# Patient Record
Sex: FEMALE | Race: WHITE | NOT HISPANIC OR LATINO | Employment: FULL TIME | URBAN - NONMETROPOLITAN AREA
[De-identification: names, ages, dates, MRNs, and addresses within clinical notes are randomized per-mention and may not be internally consistent; named-entity substitution may affect disease eponyms.]

---

## 2017-04-18 ENCOUNTER — OFFICE VISIT (OUTPATIENT)
Dept: FAMILY MEDICINE CLINIC | Facility: CLINIC | Age: 22
End: 2017-04-18

## 2017-04-18 VITALS
DIASTOLIC BLOOD PRESSURE: 64 MMHG | OXYGEN SATURATION: 98 % | SYSTOLIC BLOOD PRESSURE: 100 MMHG | RESPIRATION RATE: 16 BRPM | HEART RATE: 75 BPM | WEIGHT: 143.2 LBS | BODY MASS INDEX: 25.37 KG/M2 | TEMPERATURE: 98.4 F | HEIGHT: 63 IN

## 2017-04-18 DIAGNOSIS — J02.9 SORE THROAT: Primary | ICD-10-CM

## 2017-04-18 LAB
EXPIRATION DATE: NORMAL
INTERNAL CONTROL: NORMAL
Lab: NORMAL
S PYO AG THROAT QL: NEGATIVE

## 2017-04-18 PROCEDURE — 99213 OFFICE O/P EST LOW 20 MIN: CPT | Performed by: FAMILY MEDICINE

## 2017-04-18 PROCEDURE — 87880 STREP A ASSAY W/OPTIC: CPT | Performed by: FAMILY MEDICINE

## 2017-04-18 NOTE — PROGRESS NOTES
Chief Complaint   Patient presents with   • Sore Throat     Patient said her sore throat last night. She was around a child that had strep.        History:  Ayala Lyons is a 22 y.o. female presents who today for evaluation of the above problems.  PCP currently listed as No Known Provider.     Present with  Rey, permission to speak freely given.  Last night sore throat worsening.  She has had some chills.  Was in [place] this past weekend.    and he had strep exposure.  Temp normally 97 and up to 98 today.  LMP 2 weeks ago  and normal for her.  No tobacco.  Vitals are normal, weight is normal. No seasonal allergies.  Works as nurse at .  No diurnal pattern, worsening through the day today.  The patient has no rash. Acid reflux, history of H. Pylori.  She does not want w/u today.  I will provide lab cup if she decides to provide stool sample. Using prilosec.  Zantac 30 minute onset, less likely to cause some issues as prilosec/PPI.  She will f/u if needed.     Ayala Lyons  has no past medical history on file.    No Known Allergies  History reviewed. No pertinent past medical history.  Past Surgical History:   Procedure Laterality Date   • HYMENECTOMY     • MOUTH SURGERY     • WISDOM TOOTH EXTRACTION       Family History   Problem Relation Age of Onset   • Diabetes Mother    • Hypertension Mother    • Hypertension Father    • Thyroid disease Sister    • Cancer Maternal Grandmother    • Lung cancer Maternal Grandmother    • Leukemia Maternal Grandfather        Current Outpatient Prescriptions on File Prior to Visit   Medication Sig Dispense Refill   • omeprazole (priLOSEC) 40 MG capsule Take 1 capsule by mouth Daily. 30 capsule 3     No current facility-administered medications on file prior to visit.        Family history, surgical history, past medical history, Allergies and meds reviewed with patient today and updated in [facility].     ROS:  Review of Systems  "  Constitutional: Negative for activity change and appetite change.   HENT: Positive for sore throat.    Eyes: Negative for discharge and itching.   Respiratory: Negative for apnea and chest tightness.    Cardiovascular: Negative for chest pain and leg swelling.   Gastrointestinal: Negative for abdominal distention.   Endocrine: Negative for cold intolerance and heat intolerance.   Genitourinary: Negative for difficulty urinating and dyspareunia.   Musculoskeletal: Negative for arthralgias and back pain.   Neurological: Negative for dizziness and facial asymmetry.   Hematological: Negative for adenopathy. Does not bruise/bleed easily.   Psychiatric/Behavioral: Negative for agitation and behavioral problems.       OBJECTIVE:  Vitals:    04/18/17 1506   BP: 100/64   BP Location: Left arm   Patient Position: Sitting   Cuff Size: Adult   Pulse: 75   Resp: 16   Temp: 98.4 °F (36.9 °C)   TempSrc: Oral   SpO2: 98%   Weight: 143 lb 3.2 oz (65 kg)   Height: 63\" (160 cm)     Physical Exam   Constitutional: She is oriented to person, place, and time. She appears well-developed and well-nourished. No distress.   HENT:   Head: Normocephalic and atraumatic.   Right Ear: Tympanic membrane and external ear normal.   Left Ear: Tympanic membrane and external ear normal.   Nose: Mucosal edema and rhinorrhea present. No epistaxis.  No foreign bodies.   Mouth/Throat: Normal dentition. No uvula swelling. Posterior oropharyngeal erythema present. No oropharyngeal exudate, posterior oropharyngeal edema or tonsillar abscesses. No tonsillar exudate.   Boggy turbinates, congestion present.    Eyes: Conjunctivae and EOM are normal. Pupils are equal, round, and reactive to light.   Neck: Normal range of motion. No thyromegaly present.   Cardiovascular: Normal rate, regular rhythm, normal heart sounds and intact distal pulses.    Pulmonary/Chest: No respiratory distress. She has no wheezes. She exhibits no tenderness.   CHEST/LUNG: Inspection- " symmetric chest wall no pectus deformity. Decreased effort, no distress, no use of accessory muscles. Palpation- nontender sternum, ribline.  No abnormal pulsations. Auscultation- Breath sounds mildly reduced and intermittent coarse sounds throughout all lung fields, decreased effort.  Normal tracheal sounds, Normal bronchial sounds overlying sternum, Bronchovessicular sounds decreased and coarse between scapulae posteriorly and with vessicular breath sounds heard throughout periphery. Adventitious sounds- No wheezes, no rales, intermittent rhonchi.    Abdominal: Soft. Bowel sounds are normal. She exhibits no mass. There is no tenderness. There is no rebound and no guarding.   Musculoskeletal: Normal range of motion. She exhibits no tenderness.   Lymphadenopathy:     She has no cervical adenopathy.   Neurological: She is alert and oriented to person, place, and time. No cranial nerve deficit.   Skin: Skin is warm. No rash noted.   Psychiatric: She has a normal mood and affect. Her behavior is normal. Judgment and thought content normal.   Nursing note and vitals reviewed.      Assessment/Plan:  Pharyngitis: Ddx discussed today viral URI with post nasal drip, strep pharyngitis, mononucleosis, viral pharyngitis such as herpangina H/F/M syndrome.  Patient has acute pharyngitis by history and exam.  Discussed centor criteria. Discussed limitations of rapid strep test.  Discussed mono and rash and role of PCN in this process.  If rapid strep + we will treat, if negative we will culture.  Current recommendation is to not treat without + test.  Reviewed exposures and travel.  We discussed antibiotic options, to include PCN (amoxil as first line) and macrolides as first line therapy. Discussed risks/benefits allergies to medications today.  Treatments listed.  Handouts offered and printed for the patient/family on requested medications.  Education handout provided on any new Rx.  If strep treatment is used, the patient  needs to dispose of toothbrush and use new device in 24 hours.  NO school or work until fever free 24 hours or after 24 hours of abx if no fever. Gargle salt water BID.  Fluid hydration encouraged.   · Offered handout to patient regarding dx.  · Allergy recommendations discussed.  Would change pillowcases and wash with hot/dry hot 2x weekly and change sheets minimum weekly. Consider anti-allergenic coverings for sheets/pillowcases.  Avoid tobacco, Keep pets out of room of sleeping as able.  Use home circulation instead of windows down.  Nasal steroids discussed today.  · Risks/benefits of abx and steroids discussed with patient today.  · Take abx with food to decrease risks of diarrhea. Increased yogurt to decrease this risk further.  Consider probiotics.  · Decadron, dexamethasone, methylprednisolone, prednisone. Pt notified of potential pros/risks of steroid treatment including rapid improvement of condition; allergic reaction, psychologic reaction (depression, anxiety & insomnia), skin change at injection site (color, dimpling), muscle weakness, changes in blood sugar, cataracts/glaucoma, AVN.  This list is not all inclusive and patient is aware they may refuse treatment.  · For Female patients  · Risks of yeast infection and abx d/w patient.   · If taking antibiotics and using birth control at the same time it is important to    use a backup method of birth control for 2-4 weeks as antibiotics can decrease    efficacy of the birth control.   · Please see orders as listed below.         Orders Placed Today:  Ayala was seen today for sore throat.    Diagnoses and all orders for this visit:    Sore throat  -     POCT rapid strep A  -     Strep throat culture    Other orders  -     lidocaine viscous (XYLOCAINE) 2 % solution; Take 5 mL by mouth As Needed for Mild Pain (1-3) (throat pain).        Risks/benefits of current and new medications discussed with the patient and or family today.  The patient/family are aware  and accept that if there any side effects they should call or return to clinic as soon as possible.  Appropriate F/U discussed for topics addressed today. All questions were answered to the satisfactory state of patient/family.  Should symptoms fail to improve or worsen they agree to call or return to clinic or to go to the ER. Education handouts were offered on any new Rx if requested.  Discussed the importance of following up with any needed screening tests/labs/specialist appointments and any requested follow-up recommended by me today.  Importance of maintaining follow-up discussed and patient accepts that missed appointments can delay diagnosis and potentially lead to worsening of conditions.    An After Visit Summary was printed and given to the patient at discharge.  Follow-up: Return if symptoms worsen or fail to improve.         Tony Velazquez M.D. 4/18/2017

## 2017-04-18 NOTE — PATIENT INSTRUCTIONS
Pharyngitis  Pharyngitis is redness, pain, and swelling (inflammation) of your pharynx.   CAUSES   Pharyngitis is usually caused by infection. Most of the time, these infections are from viruses (viral) and are part of a cold. However, sometimes pharyngitis is caused by bacteria (bacterial). Pharyngitis can also be caused by allergies. Viral pharyngitis may be spread from person to person by coughing, sneezing, and personal items or utensils (cups, forks, spoons, toothbrushes). Bacterial pharyngitis may be spread from person to person by more intimate contact, such as kissing.   SIGNS AND SYMPTOMS   Symptoms of pharyngitis include:    · Sore throat.    · Tiredness (fatigue).    · Low-grade fever.    · Headache.  · Joint pain and muscle aches.  · Skin rashes.  · Swollen lymph nodes.  · Plaque-like film on throat or tonsils (often seen with bacterial pharyngitis).  DIAGNOSIS   Your health care provider will ask you questions about your illness and your symptoms. Your medical history, along with a physical exam, is often all that is needed to diagnose pharyngitis. Sometimes, a rapid strep test is done. Other lab tests may also be done, depending on the suspected cause.   TREATMENT   Viral pharyngitis will usually get better in 3-4 days without the use of medicine. Bacterial pharyngitis is treated with medicines that kill germs (antibiotics).   HOME CARE INSTRUCTIONS   · Drink enough water and fluids to keep your urine clear or pale yellow.    · Only take over-the-counter or prescription medicines as directed by your health care provider:      If you are prescribed antibiotics, make sure you finish them even if you start to feel better.      Do not take aspirin.    · Get lots of rest.    · Gargle with 8 oz of salt water (½ tsp of salt per 1 qt of water) as often as every 1-2 hours to soothe your throat.    · Throat lozenges (if you are not at risk for choking) or sprays may be used to soothe your throat.  SEEK MEDICAL  CARE IF:   · You have large, tender lumps in your neck.  · You have a rash.  · You cough up green, yellow-brown, or bloody spit.  SEEK IMMEDIATE MEDICAL CARE IF:   · Your neck becomes stiff.  · You drool or are unable to swallow liquids.  · You vomit or are unable to keep medicines or liquids down.  · You have severe pain that does not go away with the use of recommended medicines.  · You have trouble breathing (not caused by a stuffy nose).  MAKE SURE YOU:   · Understand these instructions.  · Will watch your condition.  · Will get help right away if you are not doing well or get worse.     This information is not intended to replace advice given to you by your health care provider. Make sure you discuss any questions you have with your health care provider.     Document Released: 12/18/2006 Document Revised: 10/08/2014 Document Reviewed: 08/25/2014  [employer] Interactive Patient Education ©2016 [employer]  Helicobacter Pylori Antibodies Test  WHY AM I HAVING THIS TEST?  This is a blood test that looks for bacteria called Helicobacter pylori (H. pylori). H. pylori is a germ that can be found in the cells that line the stomach. Having high levels of H. pylori in your stomach puts you at risk for stomach ulcers and small bowel ulcers, long-term (chronic) inflammation of the lining of the stomach, or even ulcers that may occur in the canal that runs from the mouth to the stomach (esophagus). Presence of H. pylori can also increase your risk for stomach cancer if it is left untreated.  Most people with H. pylori in their stomach bacteria have no symptoms. Your health care provider may ask you to have this test if you have symptoms of a stomach ulcer or small bowel ulcer, such as stomach pain before or after eating, heartburn, or nausea repeatedly after eating.  WHAT KIND OF SAMPLE IS TAKEN?  A blood sample is required for this test. It is usually collected by inserting a needle into a vein or sticking a finger with a  small needle.  HOW DO I PREPARE FOR THE TEST?  There is no preparation required for this test.  WHAT ARE THE REFERENCE RANGES?  Reference ranges are considered healthy ranges established after testing a large group of healthy people. Reference ranges may vary among different people, labs, and hospitals. It is your responsibility to obtain your test results. Ask the lab or department performing the test when and how you will get your results.  Your test results will be reported as positive, negative, or equivocal. Equivocal means that your results are neither positive nor negative.  References ranges for these results are as follows:  · Less than or equal to 30 units/mL. This is negative.  · Greater than or equal to 40 units/mL. This is positive.  · 30.01-39.99 units/mL. This is equivocal.  WHAT DO THE RESULTS MEAN?  Test results that are higher than normal may indicate numerous health conditions. These may include:  · Short-term or long-term irritation of the stomach lining (gastritis).  · Small bowel ulcer.  · Stomach ulcer.  · Stomach cancer.  Talk with your health care provider to discuss your results, treatment options, and if necessary, the need for more tests. Talk with your health care provider if you have any questions about your results.     This information is not intended to replace advice given to you by your health care provider. Make sure you discuss any questions you have with your health care provider.     Document Released: 01/11/2006 Document Revised: 01/08/2016 Document Reviewed: 05/01/2015  [employer] Interactive Patient Education ©2016 [employer]

## 2017-04-21 LAB — B-HEM STREP SPEC QL CULT: NEGATIVE

## 2017-04-21 NOTE — PROGRESS NOTES
Negative strep culture.  There is no evidence of strep throat.  F/U as needed.  Tony Velazquez MD 4/21/2017 1:23 PM

## 2017-09-25 ENCOUNTER — OFFICE VISIT (OUTPATIENT)
Dept: FAMILY MEDICINE CLINIC | Facility: CLINIC | Age: 22
End: 2017-09-25

## 2017-09-25 VITALS
BODY MASS INDEX: 24.7 KG/M2 | HEIGHT: 63 IN | OXYGEN SATURATION: 97 % | SYSTOLIC BLOOD PRESSURE: 106 MMHG | WEIGHT: 139.4 LBS | DIASTOLIC BLOOD PRESSURE: 74 MMHG | HEART RATE: 90 BPM | TEMPERATURE: 98.5 F | RESPIRATION RATE: 16 BRPM

## 2017-09-25 DIAGNOSIS — R05.9 COUGH: ICD-10-CM

## 2017-09-25 DIAGNOSIS — J06.9 UPPER RESPIRATORY TRACT INFECTION, UNSPECIFIED TYPE: ICD-10-CM

## 2017-09-25 DIAGNOSIS — J02.9 SORE THROAT: Primary | ICD-10-CM

## 2017-09-25 LAB
EXPIRATION DATE: NORMAL
INTERNAL CONTROL: NORMAL
Lab: NORMAL
S PYO AG THROAT QL: NEGATIVE

## 2017-09-25 PROCEDURE — 96372 THER/PROPH/DIAG INJ SC/IM: CPT | Performed by: NURSE PRACTITIONER

## 2017-09-25 PROCEDURE — 87880 STREP A ASSAY W/OPTIC: CPT | Performed by: NURSE PRACTITIONER

## 2017-09-25 PROCEDURE — 99212 OFFICE O/P EST SF 10 MIN: CPT | Performed by: NURSE PRACTITIONER

## 2017-09-25 RX ORDER — DEXAMETHASONE SODIUM PHOSPHATE 10 MG/ML
10 INJECTION INTRAMUSCULAR; INTRAVENOUS ONCE
Status: COMPLETED | OUTPATIENT
Start: 2017-09-25 | End: 2017-09-25

## 2017-09-25 RX ORDER — FLUTICASONE PROPIONATE 50 MCG
2 SPRAY, SUSPENSION (ML) NASAL DAILY
Qty: 1 BOTTLE | Refills: 0 | Status: SHIPPED | OUTPATIENT
Start: 2017-09-25

## 2017-09-25 RX ORDER — BENZONATATE 100 MG/1
200 CAPSULE ORAL 3 TIMES DAILY PRN
Qty: 60 CAPSULE | Refills: 0 | Status: SHIPPED | OUTPATIENT
Start: 2017-09-25

## 2017-09-25 RX ORDER — DEXTROMETHORPHAN HYDROBROMIDE AND PROMETHAZINE HYDROCHLORIDE 15; 6.25 MG/5ML; MG/5ML
5 SYRUP ORAL NIGHTLY
Qty: 50 ML | Refills: 0 | Status: SHIPPED | OUTPATIENT
Start: 2017-09-25 | End: 2017-10-05

## 2017-09-25 RX ADMIN — DEXAMETHASONE SODIUM PHOSPHATE 10 MG: 10 INJECTION INTRAMUSCULAR; INTRAVENOUS at 14:07

## 2017-09-25 NOTE — PROGRESS NOTES
Chief Complaint   Patient presents with   • URI        HISTORY/ HPI:  Ayala Lyons is a 22 y.o.  female who presents  today for an acute complaint of sore throat, cough, head/ facial pain, and rhinorrhea; onset last Thursday x 5 days; describes sore throat as scratchy but is better since onset; denies radiation of pain or dental pain; has used Dayquil, Nyquil, and cough drops only with minimal relief; cough is worse at night and with lying down; rates severity of symptoms 5/10; known exposure to  with similar symptoms; no reported recent illnesses.      Ayala Lyons  has no past medical history on file.    No Known Allergies    Current Outpatient Prescriptions:   •  Norgestimate-Eth Estradiol (MONONESSA PO), Take  by mouth Daily., Disp: , Rfl:   History reviewed. No pertinent past medical history.  Past Surgical History:   Procedure Laterality Date   • HYMENECTOMY     • MOUTH SURGERY     • WISDOM TOOTH EXTRACTION       Social History     Social History   • Marital status:      Spouse name: N/A   • Number of children: N/A   • Years of education: N/A     Social History Main Topics   • Smoking status: Never Smoker   • Smokeless tobacco: Never Used   • Alcohol use No   • Drug use: No   • Sexual activity: Defer     Other Topics Concern   • None     Social History Narrative     Family History   Problem Relation Age of Onset   • Diabetes Mother    • Hypertension Mother    • Hypertension Father    • Thyroid disease Sister    • Cancer Maternal Grandmother    • Lung cancer Maternal Grandmother    • Leukemia Maternal Grandfather        Family history, surgical history, past medical history, Allergies and meds reviewed with patient today and updated in [facility].     ROS:  Review of Systems   Constitutional: Positive for chills, fatigue and fever (Thursday 99.8; no additional complaints of fever). Negative for unexpected weight change.   HENT: Positive for rhinorrhea (green in the am), sinus  "pain, sinus pressure, sneezing and sore throat (scratchy). Negative for ear pain.    Eyes: Negative for itching.   Respiratory: Positive for cough (intermittent yellow-green sputum). Negative for chest tightness.    Cardiovascular: Negative for chest pain.   Gastrointestinal: Negative.    Endocrine: Negative.    Genitourinary: Negative.    Musculoskeletal: Positive for myalgias.   Skin: Negative for rash.   Allergic/Immunologic: Negative.    Neurological: Negative.    Hematological: Negative.    Psychiatric/Behavioral: Negative.      OBJECTIVE:  Vitals:    09/25/17 1307   BP: 106/74   Pulse: 90   Resp: 16   Temp: 98.5 °F (36.9 °C)   SpO2: 97%   Weight: 139 lb 6.4 oz (63.2 kg)   Height: 63\" (160 cm)     Physical Exam   Constitutional: She is oriented to person, place, and time. Vital signs are normal. She appears well-developed and well-nourished. She is cooperative.   HENT:   Head: Normocephalic and atraumatic.   Right Ear: Tympanic membrane normal. Tympanic membrane is not injected, not perforated, not erythematous, not retracted and not bulging.   Left Ear: Tympanic membrane normal. Tympanic membrane is not injected, not perforated, not erythematous, not retracted and not bulging.   Nose: Mucosal edema and rhinorrhea present. Sinus tenderness: clear.   Mouth/Throat: Oropharynx is clear and moist and mucous membranes are normal. No posterior oropharyngeal erythema. Tonsils are 2+ on the right. Tonsils are 2+ on the left. No tonsillar exudate.   Eyes: Conjunctivae and EOM are normal. Pupils are equal, round, and reactive to light.   Neck: Trachea normal and normal range of motion. Neck supple. No thyromegaly present.   Cardiovascular: Normal rate, regular rhythm and normal heart sounds.    Pulmonary/Chest: Effort normal. No respiratory distress.   Abdominal: Soft.   Lymphadenopathy:        Head (right side): Tonsillar adenopathy present.        Head (left side): Tonsillar adenopathy present.   Neurological: She is " alert and oriented to person, place, and time.   Skin: Skin is warm, dry and intact.   Psychiatric: She has a normal mood and affect. Her behavior is normal. Thought content normal.   Vitals reviewed.      ASSESSMENT/ PLAN:    Ayala was seen today for uri.    Diagnoses and all orders for this visit:    Sore throat  -     POC Rapid Strep A    Status:  Final result   Visible to patient:  No (Not Released) Dx:  Sore throat      Ref Range & Units 1:45 PM     Rapid Strep A Screen Negative, VALID, INVALID, Not Performed Negative   Internal Control Passed Passed   Lot Number  [id number]   Expiration Date  3/31/2019           Cough  -     promethazine-dextromethorphan (PROMETHAZINE-DM) 6.25-15 MG/5ML syrup; Take 5 mL by mouth Every Night for 10 days. Do not drive or operate [employer] while taking this med will make drowsy  -     benzonatate (TESSALON PERLES) 100 MG capsule; Take 2 capsules by mouth 3 (Three) Times a Day As Needed for Cough.    Upper respiratory tract infection, unspecified type  -     fluticasone (FLONASE) 50 MCG/ACT nasal spray; 2 sprays into each nostril Daily.  -     dexamethasone (DECADRON) injection 10 mg; Inject 1 mL into the shoulder, thigh, or buttocks 1 (One) Time.    Acute URI suspect viral process.  DDX includes viruses to include corona, adeno, parainfluenza, rhinovirus, noninfectious rhinitis (vasomotor and allergic). Reviewed sick contacts, reviewed recent travel.  Reviewed tobacco history.  Avoidance of all tobacco encouraged today.  Discussed abx not recommended for this treatment at present.  The patient voiced understanding. If no improvement in 3-5 days or worsening, they can contact clinic to start abx to include Augmentin 875 PO BID x 10 days (unless PCN allergic and then other arrangements will be d/w patient). Reviewed risks/benefits of Abx discussed today.  Discussed allergies to medications.  Steroid injection discussed with patient today. R/B/A to this reviewed specifically with use  in viral URI.  Steroids by mouth d/w patient to include R/B/A.  Nasal GC R/B/A d/w patient. A few OTC options d/w patient.  Can consider Zyrtec every am.  Dayquil per package insert discussed, concern with any BP elevation or history of HTN, DM, CAD.  If no better in 3-5 days or if worsening call and we will consider to start abx. Risks/benefits of current and new medications discussed with the patient today.  The patient is aware if there any side effects they should stop meds and call or return to clinic.  Appropriate F/U discussed. All questions answered to satisfactory state of patient.  Patient left ambulatory.  Hand washing d/w patient. Cover sneezes/cough.  Avoid work/school for 24 hours if Temp >100.4.     DECADRON  Decadron, dexamethasone, methylprednisolone- Pt notified of potential pros/risks of steroid treatment including rapid improvement of condition; allergic reaction, psychologic reaction (depression, anxiety & insomnia), hyperglycemia, skin change at injection site (color, dimpling), muscle weakness.  Pt is aware they may refuse treatment.    ASSESSMENT & PLAN:    Johnathan Cartagena, APRN 2017 1:53 PM    Orders Placed Today:     New Medications Ordered This Visit   Medications   • promethazine-dextromethorphan (PROMETHAZINE-DM) 6.25-15 MG/5ML syrup     Sig: Take 5 mL by mouth Every Night for 10 days. Do not drive or operate [employer] while taking this med will make drowsy     Dispense:  50 mL     Refill:  0   • fluticasone (FLONASE) 50 MCG/ACT nasal spray     Si sprays into each nostril Daily.     Dispense:  1 bottle     Refill:  0   • benzonatate (TESSALON PERLES) 100 MG capsule     Sig: Take 2 capsules by mouth 3 (Three) Times a Day As Needed for Cough.     Dispense:  60 capsule     Refill:  0   • dexamethasone (DECADRON) injection 10 mg        Management Options:    1. Take all medications as prescribed; avoid driving while taking phenergan DM; any concerns or signs of an adverse  reaction to any medication please discontinue and call the clinic immediately or go to your nearest emergency department.  2. Increase PO fluids to thin secretions.  3. Tylenol and or Motrin PRN for fevers; warm salt water gargles for sore throat.  4. Follow up as needed for any new or worsening conditions.   5.  If symptoms have not resolved or have worsened by Thursday 9/28/2017, please notify me and an antibiotic will be sent to your pharmacy of choice.     Risks/benefits of current and new medications discussed with the patient and or family today.  The patient/family are aware and accept that if there any side effects they should call or return to clinic as soon as possible.  Appropriate F/U discussed for topics addressed today. All questions were answered to the satisfactory state of patient/family.  Should symptoms fail to improve or worsen they agree to call or return to clinic or to go to the ER. Education handouts were offered on any new Rx if requested.  Discussed the importance of following up with any needed screening tests/labs/specialist appointments and any requested follow-up recommended by me today.  Importance of maintaining follow-up discussed and patient accepts that missed appointments can delay diagnosis and potentially lead to worsening of conditions.    An After Visit Summary was printed and given to the patient at discharge.    Follow-up: Return in about 1 week (around 10/2/2017), or if symptoms worsen or fail to improve.    Johnathan Cartagena, APRN 9/25/2017 1:10 PM  This note was electronically signed.

## 2017-09-25 NOTE — PATIENT INSTRUCTIONS
"Upper Respiratory Infection, Adult  Most upper respiratory infections (URIs) are a viral infection of the air passages leading to the lungs. A URI affects the nose, throat, and upper air passages. The most common type of URI is nasopharyngitis and is typically referred to as \"the common cold.\"  URIs run their course and usually go away on their own. Most of the time, a URI does not require medical attention, but sometimes a bacterial infection in the upper airways can follow a viral infection. This is called a secondary infection. Sinus and middle ear infections are common types of secondary upper respiratory infections.  Bacterial pneumonia can also complicate a URI. A URI can worsen asthma and chronic obstructive pulmonary disease (COPD). Sometimes, these complications can require emergency medical care and may be life threatening.   CAUSES  Almost all URIs are caused by viruses. A virus is a type of germ and can spread from one person to another.   RISKS FACTORS  You may be at risk for a URI if:   · You smoke.    · You have chronic heart or lung disease.  · You have a weakened defense (immune) system.    · You are very young or very old.    · You have nasal allergies or asthma.  · You work in crowded or poorly ventilated areas.  · You work in health care facilities or schools.  SIGNS AND SYMPTOMS   Symptoms typically develop 2-3 days after you come in contact with a cold virus. Most viral URIs last 7-10 days. However, viral URIs from the influenza virus (flu virus) can last 14-18 days and are typically more severe. Symptoms may include:   · Runny or stuffy (congested) nose.    · Sneezing.    · Cough.    · Sore throat.    · Headache.    · Fatigue.    · Fever.    · Loss of appetite.    · Pain in your forehead, behind your eyes, and over your cheekbones (sinus pain).  · Muscle aches.    DIAGNOSIS   Your health care provider may diagnose a URI by:  · Physical exam.  · Tests to check that your symptoms are not due to " another condition such as:  ¨ Strep throat.  ¨ Sinusitis.  ¨ Pneumonia.  ¨ Asthma.  TREATMENT   A URI goes away on its own with time. It cannot be cured with medicines, but medicines may be prescribed or recommended to relieve symptoms. Medicines may help:  · Reduce your fever.  · Reduce your cough.  · Relieve nasal congestion.  HOME CARE INSTRUCTIONS   · Take medicines only as directed by your health care provider.    · Gargle warm saltwater or take cough drops to comfort your throat as directed by your health care provider.  · Use a warm mist humidifier or inhale steam from a shower to increase air moisture. This may make it easier to breathe.  · Drink enough fluid to keep your urine clear or pale yellow.    · Eat soups and other clear broths and maintain good nutrition.    · Rest as needed.    · Return to work when your temperature has returned to normal or as your health care provider advises. You may need to stay home longer to avoid infecting others. You can also use a face mask and careful hand washing to prevent spread of the virus.  · Increase the usage of your inhaler if you have asthma.    · Do not use any tobacco products, including cigarettes, chewing tobacco, or electronic cigarettes. If you need help quitting, ask your health care provider.  PREVENTION   The best way to protect yourself from getting a cold is to practice good hygiene.   · Avoid oral or hand contact with people with cold symptoms.    · Wash your hands often if contact occurs.    There is no clear evidence that vitamin C, vitamin E, echinacea, or exercise reduces the chance of developing a cold. However, it is always recommended to get plenty of rest, exercise, and practice good nutrition.   SEEK MEDICAL CARE IF:   · You are getting worse rather than better.    · Your symptoms are not controlled by medicine.    · You have chills.  · You have worsening shortness of breath.  · You have brown or red mucus.  · You have yellow or brown nasal  discharge.  · You have pain in your face, especially when you bend forward.  · You have a fever.  · You have swollen neck glands.  · You have pain while swallowing.  · You have white areas in the back of your throat.  SEEK IMMEDIATE MEDICAL CARE IF:   · You have severe or persistent:    Headache.    Ear pain.    Sinus pain.    Chest pain.  · You have chronic lung disease and any of the following:    Wheezing.    Prolonged cough.    Coughing up blood.    A change in your usual mucus.  · You have a stiff neck.  · You have changes in your:    Vision.    Hearing.    Thinking.    Mood.  MAKE SURE YOU:   · Understand these instructions.  · Will watch your condition.  · Will get help right away if you are not doing well or get worse.     This information is not intended to replace advice given to you by your health care provider. Make sure you discuss any questions you have with your health care provider.     Document Released: 06/13/2002 Document Revised: 05/03/2016 Document Reviewed: 03/25/2015  [employer] Patient Education ©2017 [employer]